# Patient Record
Sex: FEMALE | Race: WHITE | Employment: OTHER | URBAN - NONMETROPOLITAN AREA
[De-identification: names, ages, dates, MRNs, and addresses within clinical notes are randomized per-mention and may not be internally consistent; named-entity substitution may affect disease eponyms.]

---

## 2021-06-14 ENCOUNTER — HOSPITAL ENCOUNTER (EMERGENCY)
Age: 81
Discharge: HOME | End: 2021-06-14
Payer: MEDICARE

## 2021-06-14 VITALS
WEIGHT: 200 LBS | HEART RATE: 78 BPM | TEMPERATURE: 97.5 F | DIASTOLIC BLOOD PRESSURE: 79 MMHG | RESPIRATION RATE: 18 BRPM | OXYGEN SATURATION: 97 % | SYSTOLIC BLOOD PRESSURE: 171 MMHG

## 2021-06-14 DIAGNOSIS — H00.022 HORDEOLUM INTERNUM OF RIGHT LOWER EYELID: Primary | ICD-10-CM

## 2021-06-14 DIAGNOSIS — L03.213 PERIORBITAL CELLULITIS OF RIGHT EYE: ICD-10-CM

## 2021-06-14 PROCEDURE — 99203 OFFICE O/P NEW LOW 30 MIN: CPT

## 2021-06-14 PROCEDURE — 99203 OFFICE O/P NEW LOW 30 MIN: CPT | Performed by: NURSE PRACTITIONER

## 2021-06-14 RX ORDER — ERYTHROMYCIN 5 MG/G
OINTMENT OPHTHALMIC
Qty: 1 G | Refills: 0 | Status: SHIPPED | OUTPATIENT
Start: 2021-06-14

## 2021-06-14 RX ORDER — LEVOTHYROXINE SODIUM 0.1 MG/1
100 TABLET ORAL DAILY
COMMUNITY

## 2021-06-14 RX ORDER — DOXYCYCLINE HYCLATE 100 MG
100 TABLET ORAL 2 TIMES DAILY
Qty: 14 TABLET | Refills: 0 | Status: SHIPPED | OUTPATIENT
Start: 2021-06-14 | End: 2021-06-21

## 2021-06-14 RX ORDER — ALBUTEROL SULFATE 2.5 MG/3ML
2.5 SOLUTION RESPIRATORY (INHALATION) EVERY 6 HOURS PRN
COMMUNITY

## 2021-06-14 ASSESSMENT — ENCOUNTER SYMPTOMS
EYE ITCHING: 0
EYE REDNESS: 1
VOMITING: 0
EYE DISCHARGE: 1
NAUSEA: 0
PHOTOPHOBIA: 0
SHORTNESS OF BREATH: 1
EYE PAIN: 1

## 2021-06-14 ASSESSMENT — PAIN DESCRIPTION - DESCRIPTORS: DESCRIPTORS: SORE

## 2021-06-14 ASSESSMENT — PAIN SCALES - GENERAL: PAINLEVEL_OUTOF10: 7

## 2021-06-14 ASSESSMENT — PAIN DESCRIPTION - FREQUENCY: FREQUENCY: CONTINUOUS

## 2021-06-14 NOTE — ED PROVIDER NOTES
[address]  Urgent Care Encounter       CHIEF COMPLAINT       Chief Complaint   Patient presents with    Conjunctivitis     redness and swelling to right lower lid onset sat. using hot and cold compresses but getting worse       Nurses Notes reviewed and I agree except as noted in the HPI. HISTORY OF PRESENT ILLNESS   Tamela Hammans is a 79 y.o. female who presents with complaints of pain, redness and swelling to the right lower eyelid, onset 2 days ago. Patient also reports yellow discharge and eye matting. No changes in vision or photophobia. Redness has increased down onto her cheek. She has been using hot and cold compresses as well is using an allergy type eyedrop with no relief. No reports of fever, chills, nausea or vomiting. The history is provided by the patient. REVIEW OF SYSTEMS     Review of Systems   Constitutional: Negative for chills and fever. Eyes: Positive for pain, discharge and redness. Negative for photophobia, itching and visual disturbance. Respiratory: Positive for shortness of breath (Chronic, COPD). Cardiovascular: Negative for chest pain. Gastrointestinal: Negative for nausea and vomiting. Skin: Negative for wound. Neurological: Negative for headaches. PAST MEDICAL HISTORY   History reviewed. No pertinent past medical history. SURGICALHISTORY     Patient  has no past surgical history on file. CURRENT MEDICATIONS       Previous Medications    ALBUTEROL (PROVENTIL) (2.5 MG/3ML) 0.083% NEBULIZER SOLUTION    Take 2.5 mg by nebulization every 6 hours as needed for Wheezing    LEVOTHYROXINE (SYNTHROID) 100 MCG TABLET    Take 100 mcg by mouth Daily    OXYGEN    Inhale 2 L into the lungs At night as needed       ALLERGIES     Patient is is allergic to pcn [penicillins].     Patients   Immunization History   Administered Date(s) Administered    COVID-19, Moderna, PF, 100mcg/0.5mL 03/03/2021, 03/31/2021       FAMILY HISTORY     Patient's family history is not on file. SOCIAL HISTORY     Patient  reports that she quit smoking about 8 years ago. She has never used smokeless tobacco. She reports previous alcohol use. She reports that she does not use drugs. PHYSICAL EXAM     ED TRIAGE VITALS  BP: (!) 171/79, Temp: 97.5 °F (36.4 °C), Pulse: 78, Resp: 18, SpO2: 97 %,There is no height or weight on file to calculate BMI.,No LMP recorded. Patient is postmenopausal.    Physical Exam  Vitals and nursing note reviewed. Constitutional:       General: She is not in acute distress. Appearance: She is well-developed. HENT:      Head: Normocephalic and atraumatic. Eyes:      General:         Right eye: Discharge and hordeolum (Internal to right lower eyelid) present. Conjunctiva/sclera:      Right eye: Right conjunctiva is injected. No chemosis. Pupils: Pupils are equal, round, and reactive to light. Comments: Internal hordeolum to the lower right mid medially. Erythema extends below the lower lid onto the cheek. No erythema to the upper lid or inside of the nose. Cardiovascular:      Rate and Rhythm: Normal rate and regular rhythm. Heart sounds: Normal heart sounds. Pulmonary:      Effort: Pulmonary effort is normal. No respiratory distress. Breath sounds: Normal breath sounds. Skin:     General: Skin is warm and dry. Neurological:      General: No focal deficit present. Mental Status: She is alert and oriented to person, place, and time. Psychiatric:         Mood and Affect: Mood normal.         Speech: Speech normal.         Behavior: Behavior normal. Behavior is cooperative. DIAGNOSTIC RESULTS     Labs:No results found for this visit on 06/14/21.     IMAGING:    No orders to display         EKG:      URGENT CARE COURSE:     Vitals:    06/14/21 0815   BP: (!) 171/79   Pulse: 78   Resp: 18   Temp: 97.5 °F (36.4 °C)   TempSrc: Temporal   SpO2: 97%   Weight: 200 lb (90.7 kg)       Medications - No data to display         PROCEDURES:  None    FINAL IMPRESSION      1. Hordeolum internum of right lower eyelid    2. Periorbital cellulitis of right eye          DISPOSITION/ PLAN     Patient presents with internal hordeolum to the right lower lid as well as possible periorbital cellulitis with erythema extending below the right. Patient will be treated with erythromycin ointment as well as doxycycline. She is to follow-up with family doctor or eye doctor in 2 to 3 days if not improved. Warm compresses and gentle massage to the wound-year-old. Further instructions were outlined verbally and in the patient's discharge instructions. All the patient's questions were answered. The patient/parent agreed with the plan and was discharged from the [facility] in good condition.       PATIENT REFERRED TO:  JOSÉ Boucher CNP  [place] [id number] / [facility] [id number]      DISCHARGE MEDICATIONS:  New Prescriptions    DOXYCYCLINE HYCLATE (VIBRA-TABS) 100 MG TABLET    Take 1 tablet by mouth 2 times daily for 7 days    ERYTHROMYCIN (ROMYCIN) 5 MG/GM OPHTHALMIC OINTMENT    1/2 inch 3 times daily for 7 days to right eye       Discontinued Medications    No medications on file       Current Discharge Medication List          JOSÉ Clark CNP    (Please note that portions of this note were completed with a voice recognition program. Efforts were made to edit the dictations but occasionally words are mis-transcribed.)         JOSÉ Clark CNP  06/14/21 [id number]